# Patient Record
Sex: MALE | Race: WHITE | NOT HISPANIC OR LATINO
[De-identification: names, ages, dates, MRNs, and addresses within clinical notes are randomized per-mention and may not be internally consistent; named-entity substitution may affect disease eponyms.]

---

## 2021-09-16 DIAGNOSIS — Z01.818 ENCOUNTER FOR OTHER PREPROCEDURAL EXAMINATION: ICD-10-CM

## 2021-09-16 PROBLEM — Z00.00 ENCOUNTER FOR PREVENTIVE HEALTH EXAMINATION: Status: ACTIVE | Noted: 2021-09-16

## 2021-09-24 ENCOUNTER — APPOINTMENT (OUTPATIENT)
Dept: DISASTER EMERGENCY | Facility: CLINIC | Age: 71
End: 2021-09-24

## 2021-09-25 LAB — SARS-COV-2 N GENE NPH QL NAA+PROBE: NOT DETECTED

## 2025-03-25 ENCOUNTER — APPOINTMENT (OUTPATIENT)
Dept: ORTHOPEDIC SURGERY | Facility: CLINIC | Age: 75
End: 2025-03-25
Payer: MEDICARE

## 2025-03-25 VITALS — BODY MASS INDEX: 28.04 KG/M2 | WEIGHT: 185 LBS | HEIGHT: 68 IN

## 2025-03-25 DIAGNOSIS — Z78.9 OTHER SPECIFIED HEALTH STATUS: ICD-10-CM

## 2025-03-25 DIAGNOSIS — Z96.652 PRESENCE OF LEFT ARTIFICIAL KNEE JOINT: ICD-10-CM

## 2025-03-25 DIAGNOSIS — Z86.39 PERSONAL HISTORY OF OTHER ENDOCRINE, NUTRITIONAL AND METABOLIC DISEASE: ICD-10-CM

## 2025-03-25 DIAGNOSIS — M17.11 UNILATERAL PRIMARY OSTEOARTHRITIS, RIGHT KNEE: ICD-10-CM

## 2025-03-25 PROCEDURE — 99204 OFFICE O/P NEW MOD 45 MIN: CPT

## 2025-03-25 PROCEDURE — 73564 X-RAY EXAM KNEE 4 OR MORE: CPT | Mod: 50

## 2025-03-25 PROCEDURE — G2211 COMPLEX E/M VISIT ADD ON: CPT

## 2025-03-26 ENCOUNTER — NON-APPOINTMENT (OUTPATIENT)
Age: 75
End: 2025-03-26

## 2025-03-28 ENCOUNTER — TRANSCRIPTION ENCOUNTER (OUTPATIENT)
Age: 75
End: 2025-03-28

## 2025-04-08 ENCOUNTER — OUTPATIENT (OUTPATIENT)
Dept: OUTPATIENT SERVICES | Facility: HOSPITAL | Age: 75
LOS: 1 days | End: 2025-04-08
Payer: MEDICARE

## 2025-04-08 VITALS
HEART RATE: 55 BPM | WEIGHT: 179.9 LBS | OXYGEN SATURATION: 99 % | TEMPERATURE: 98 F | DIASTOLIC BLOOD PRESSURE: 67 MMHG | RESPIRATION RATE: 14 BRPM | SYSTOLIC BLOOD PRESSURE: 150 MMHG | HEIGHT: 67 IN

## 2025-04-08 DIAGNOSIS — Z98.890 OTHER SPECIFIED POSTPROCEDURAL STATES: Chronic | ICD-10-CM

## 2025-04-08 DIAGNOSIS — Z96.652 PRESENCE OF LEFT ARTIFICIAL KNEE JOINT: Chronic | ICD-10-CM

## 2025-04-08 DIAGNOSIS — M17.11 UNILATERAL PRIMARY OSTEOARTHRITIS, RIGHT KNEE: ICD-10-CM

## 2025-04-08 DIAGNOSIS — Z01.818 ENCOUNTER FOR OTHER PREPROCEDURAL EXAMINATION: ICD-10-CM

## 2025-04-08 LAB
A1C WITH ESTIMATED AVERAGE GLUCOSE RESULT: 5.9 % — HIGH (ref 4–5.6)
ALBUMIN SERPL ELPH-MCNC: 3.7 G/DL — SIGNIFICANT CHANGE UP (ref 3.3–5)
ALP SERPL-CCNC: 98 U/L — SIGNIFICANT CHANGE UP (ref 30–120)
ALT FLD-CCNC: 46 U/L — SIGNIFICANT CHANGE UP (ref 10–60)
ANION GAP SERPL CALC-SCNC: 4 MMOL/L — LOW (ref 5–17)
APTT BLD: 30.9 SEC — SIGNIFICANT CHANGE UP (ref 24.5–35.6)
AST SERPL-CCNC: 32 U/L — SIGNIFICANT CHANGE UP (ref 10–40)
BILIRUB SERPL-MCNC: 0.6 MG/DL — SIGNIFICANT CHANGE UP (ref 0.2–1.2)
BLD GP AB SCN SERPL QL: SIGNIFICANT CHANGE UP
BUN SERPL-MCNC: 16 MG/DL — SIGNIFICANT CHANGE UP (ref 7–23)
CALCIUM SERPL-MCNC: 9.1 MG/DL — SIGNIFICANT CHANGE UP (ref 8.4–10.5)
CHLORIDE SERPL-SCNC: 104 MMOL/L — SIGNIFICANT CHANGE UP (ref 96–108)
CO2 SERPL-SCNC: 32 MMOL/L — HIGH (ref 22–31)
CREAT SERPL-MCNC: 0.91 MG/DL — SIGNIFICANT CHANGE UP (ref 0.5–1.3)
EGFR: 87 ML/MIN/1.73M2 — SIGNIFICANT CHANGE UP
EGFR: 87 ML/MIN/1.73M2 — SIGNIFICANT CHANGE UP
ESTIMATED AVERAGE GLUCOSE: 123 MG/DL — HIGH (ref 68–114)
GLUCOSE SERPL-MCNC: 113 MG/DL — HIGH (ref 70–99)
HCT VFR BLD CALC: 44.2 % — SIGNIFICANT CHANGE UP (ref 39–50)
HGB BLD-MCNC: 15 G/DL — SIGNIFICANT CHANGE UP (ref 13–17)
INR BLD: 1.16 RATIO — SIGNIFICANT CHANGE UP (ref 0.85–1.16)
MCHC RBC-ENTMCNC: 29.9 PG — SIGNIFICANT CHANGE UP (ref 27–34)
MCHC RBC-ENTMCNC: 33.9 G/DL — SIGNIFICANT CHANGE UP (ref 32–36)
MCV RBC AUTO: 88.2 FL — SIGNIFICANT CHANGE UP (ref 80–100)
MRSA PCR RESULT.: SIGNIFICANT CHANGE UP
NRBC BLD AUTO-RTO: 0 /100 WBCS — SIGNIFICANT CHANGE UP (ref 0–0)
PLATELET # BLD AUTO: 273 K/UL — SIGNIFICANT CHANGE UP (ref 150–400)
POTASSIUM SERPL-MCNC: 4.8 MMOL/L — SIGNIFICANT CHANGE UP (ref 3.5–5.3)
POTASSIUM SERPL-SCNC: 4.8 MMOL/L — SIGNIFICANT CHANGE UP (ref 3.5–5.3)
PROT SERPL-MCNC: 7.3 G/DL — SIGNIFICANT CHANGE UP (ref 6–8.3)
PROTHROM AB SERPL-ACNC: 13.4 SEC — SIGNIFICANT CHANGE UP (ref 9.9–13.4)
RBC # BLD: 5.01 M/UL — SIGNIFICANT CHANGE UP (ref 4.2–5.8)
RBC # FLD: 11.1 % — SIGNIFICANT CHANGE UP (ref 10.3–14.5)
S AUREUS DNA NOSE QL NAA+PROBE: SIGNIFICANT CHANGE UP
SODIUM SERPL-SCNC: 140 MMOL/L — SIGNIFICANT CHANGE UP (ref 135–145)
WBC # BLD: 6.9 K/UL — SIGNIFICANT CHANGE UP (ref 3.8–10.5)
WBC # FLD AUTO: 6.9 K/UL — SIGNIFICANT CHANGE UP (ref 3.8–10.5)

## 2025-04-08 PROCEDURE — 85730 THROMBOPLASTIN TIME PARTIAL: CPT

## 2025-04-08 PROCEDURE — 86900 BLOOD TYPING SEROLOGIC ABO: CPT

## 2025-04-08 PROCEDURE — 36415 COLL VENOUS BLD VENIPUNCTURE: CPT

## 2025-04-08 PROCEDURE — 83036 HEMOGLOBIN GLYCOSYLATED A1C: CPT

## 2025-04-08 PROCEDURE — 93010 ELECTROCARDIOGRAM REPORT: CPT

## 2025-04-08 PROCEDURE — 87641 MR-STAPH DNA AMP PROBE: CPT

## 2025-04-08 PROCEDURE — 85610 PROTHROMBIN TIME: CPT

## 2025-04-08 PROCEDURE — 86850 RBC ANTIBODY SCREEN: CPT

## 2025-04-08 PROCEDURE — 85027 COMPLETE CBC AUTOMATED: CPT

## 2025-04-08 PROCEDURE — 93005 ELECTROCARDIOGRAM TRACING: CPT

## 2025-04-08 PROCEDURE — G0463: CPT

## 2025-04-08 PROCEDURE — 86901 BLOOD TYPING SEROLOGIC RH(D): CPT

## 2025-04-08 PROCEDURE — 80053 COMPREHEN METABOLIC PANEL: CPT

## 2025-04-08 PROCEDURE — 87640 STAPH A DNA AMP PROBE: CPT

## 2025-04-08 NOTE — H&P PST ADULT - NEUROLOGICAL COMMENTS
old lacunar infarct as per MRI , no residual occipital headache f/u neurologist old lacunar infarct as per MRI , no residual effects  occipital headache f/u neurologist

## 2025-04-08 NOTE — H&P PST ADULT - NSICDXPASTMEDICALHX_GEN_ALL_CORE_FT
PAST MEDICAL HISTORY:  Constipation     Degenerative cervical disc     Degenerative lumbar disc     Occipital headache     Osteoarthritis of right knee      PAST MEDICAL HISTORY:  Constipation     Degenerative cervical disc     Degenerative lumbar disc     Occipital headache     Old lacunar stroke without late effect     Osteoarthritis of right knee

## 2025-04-08 NOTE — H&P PST ADULT - ASSESSMENT
75 y/o male with right knee OA  73 y/o male with right knee OA , H/o old lacunar infarct as per MRI report

## 2025-04-08 NOTE — H&P PST ADULT - NSICDXPASTSURGICALHX_GEN_ALL_CORE_FT
PAST SURGICAL HISTORY:  History of carpal tunnel repair     History of hand surgery     S/P total knee replacement, left

## 2025-04-08 NOTE — H&P PST ADULT - PROBLEM SELECTOR PLAN 1
scheduled for right knee replacement on 4/14/25  will obtain medical clearance   pre op instructions on wash and medications scheduled for right knee replacement on 4/14/25  will obtain medical clearance and neuro note and MRI report in chart   pre op instructions on wash and medications

## 2025-04-08 NOTE — H&P PST ADULT - HISTORY OF PRESENT ILLNESS
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 Reports pain with acticitires , walkig for long diustances . 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 Reports pain with activities , walking for long distances . scheduled for right knee replacement on 4/14/25

## 2025-04-08 NOTE — H&P PST ADULT - GENERAL COMMENTS
Positive COVID 2 weeks ago ested negative on 4/6/25 Positive COVID 2 weeks ago, tested negative on 4/6/25

## 2025-04-14 ENCOUNTER — INPATIENT (INPATIENT)
Facility: HOSPITAL | Age: 75
LOS: 0 days | Discharge: HOME | DRG: 554 | End: 2025-04-15
Attending: ORTHOPAEDIC SURGERY | Admitting: ORTHOPAEDIC SURGERY
Payer: MEDICARE

## 2025-04-14 ENCOUNTER — TRANSCRIPTION ENCOUNTER (OUTPATIENT)
Age: 75
End: 2025-04-14

## 2025-04-14 ENCOUNTER — APPOINTMENT (OUTPATIENT)
Dept: ORTHOPEDIC SURGERY | Facility: HOSPITAL | Age: 75
End: 2025-04-14
Payer: MEDICARE

## 2025-04-14 VITALS
TEMPERATURE: 97 F | WEIGHT: 180.34 LBS | DIASTOLIC BLOOD PRESSURE: 70 MMHG | HEIGHT: 67 IN | OXYGEN SATURATION: 98 % | SYSTOLIC BLOOD PRESSURE: 188 MMHG | HEART RATE: 52 BPM | RESPIRATION RATE: 15 BRPM

## 2025-04-14 DIAGNOSIS — Z98.890 OTHER SPECIFIED POSTPROCEDURAL STATES: Chronic | ICD-10-CM

## 2025-04-14 DIAGNOSIS — M17.11 UNILATERAL PRIMARY OSTEOARTHRITIS, RIGHT KNEE: ICD-10-CM

## 2025-04-14 DIAGNOSIS — Z96.652 PRESENCE OF LEFT ARTIFICIAL KNEE JOINT: Chronic | ICD-10-CM

## 2025-04-14 LAB — ABO RH CONFIRMATION: SIGNIFICANT CHANGE UP

## 2025-04-14 PROCEDURE — 20985 CPTR-ASST DIR MS PX: CPT

## 2025-04-14 PROCEDURE — 93010 ELECTROCARDIOGRAM REPORT: CPT

## 2025-04-14 PROCEDURE — 27447 TOTAL KNEE ARTHROPLASTY: CPT | Mod: RT

## 2025-04-14 PROCEDURE — 73560 X-RAY EXAM OF KNEE 1 OR 2: CPT | Mod: 26,RT

## 2025-04-14 PROCEDURE — 99222 1ST HOSP IP/OBS MODERATE 55: CPT

## 2025-04-14 DEVICE — INSERT TIB BEARING CS X3 SZ 7 9MM: Type: IMPLANTABLE DEVICE | Site: RIGHT | Status: FUNCTIONAL

## 2025-04-14 DEVICE — MAKO BONE PIN 4MM X 110MM: Type: IMPLANTABLE DEVICE | Site: RIGHT | Status: FUNCTIONAL

## 2025-04-14 DEVICE — BASEPLATE TIB TRIATHLON TRITAN SZ 7: Type: IMPLANTABLE DEVICE | Site: RIGHT | Status: FUNCTIONAL

## 2025-04-14 DEVICE — PATELLA ASYMMETRIC 38MM TRIATHLON: Type: IMPLANTABLE DEVICE | Site: RIGHT | Status: FUNCTIONAL

## 2025-04-14 DEVICE — COMP FEM CR CMNTLSS BEADED W/ PA SZ 6 RT: Type: IMPLANTABLE DEVICE | Site: RIGHT | Status: FUNCTIONAL

## 2025-04-14 DEVICE — MAKO BONE PIN 4MM X 140MM: Type: IMPLANTABLE DEVICE | Site: RIGHT | Status: FUNCTIONAL

## 2025-04-14 RX ORDER — SENNA 187 MG
2 TABLET ORAL AT BEDTIME
Refills: 0 | Status: DISCONTINUED | OUTPATIENT
Start: 2025-04-14 | End: 2025-04-15

## 2025-04-14 RX ORDER — ONDANSETRON HCL/PF 4 MG/2 ML
4 VIAL (ML) INJECTION EVERY 6 HOURS
Refills: 0 | Status: DISCONTINUED | OUTPATIENT
Start: 2025-04-14 | End: 2025-04-15

## 2025-04-14 RX ORDER — MAGNESIUM HYDROXIDE 400 MG/5ML
30 SUSPENSION ORAL DAILY
Refills: 0 | Status: DISCONTINUED | OUTPATIENT
Start: 2025-04-14 | End: 2025-04-15

## 2025-04-14 RX ORDER — TRANEXAMIC ACID 1000 MG/10
1000 AMPUL (ML) INTRAVENOUS ONCE
Refills: 0 | Status: COMPLETED | OUTPATIENT
Start: 2025-04-14 | End: 2025-04-14

## 2025-04-14 RX ORDER — ONDANSETRON HCL/PF 4 MG/2 ML
4 VIAL (ML) INJECTION ONCE
Refills: 0 | Status: DISCONTINUED | OUTPATIENT
Start: 2025-04-14 | End: 2025-04-14

## 2025-04-14 RX ORDER — ACETAMINOPHEN 500 MG/5ML
1000 LIQUID (ML) ORAL ONCE
Refills: 0 | Status: COMPLETED | OUTPATIENT
Start: 2025-04-14 | End: 2025-04-14

## 2025-04-14 RX ORDER — PSYLLIUM SEED (WITH DEXTROSE)
0 POWDER (GRAM) ORAL
Refills: 0 | DISCHARGE

## 2025-04-14 RX ORDER — HYDROMORPHONE/SOD CHLOR,ISO/PF 2 MG/10 ML
0.5 SYRINGE (ML) INJECTION
Refills: 0 | Status: DISCONTINUED | OUTPATIENT
Start: 2025-04-14 | End: 2025-04-15

## 2025-04-14 RX ORDER — DEXAMETHASONE 0.5 MG/1
8 TABLET ORAL ONCE
Refills: 0 | Status: COMPLETED | OUTPATIENT
Start: 2025-04-15 | End: 2025-04-15

## 2025-04-14 RX ORDER — POLYETHYLENE GLYCOL 3350 17 G/17G
17 POWDER, FOR SOLUTION ORAL AT BEDTIME
Refills: 0 | Status: DISCONTINUED | OUTPATIENT
Start: 2025-04-14 | End: 2025-04-15

## 2025-04-14 RX ORDER — OXYCODONE HYDROCHLORIDE 30 MG/1
5 TABLET ORAL
Refills: 0 | Status: DISCONTINUED | OUTPATIENT
Start: 2025-04-14 | End: 2025-04-15

## 2025-04-14 RX ORDER — OXYCODONE HYDROCHLORIDE 30 MG/1
10 TABLET ORAL
Refills: 0 | Status: DISCONTINUED | OUTPATIENT
Start: 2025-04-14 | End: 2025-04-15

## 2025-04-14 RX ORDER — CEFAZOLIN SODIUM IN 0.9 % NACL 3 G/100 ML
2000 INTRAVENOUS SOLUTION, PIGGYBACK (ML) INTRAVENOUS EVERY 8 HOURS
Refills: 0 | Status: COMPLETED | OUTPATIENT
Start: 2025-04-14 | End: 2025-04-15

## 2025-04-14 RX ORDER — SENNOSIDES, DOCUSATE SODIUM 8.6; 5 MG/1; MG/1
0 TABLET ORAL
Refills: 0 | DISCHARGE

## 2025-04-14 RX ORDER — CEFAZOLIN SODIUM IN 0.9 % NACL 3 G/100 ML
2000 INTRAVENOUS SOLUTION, PIGGYBACK (ML) INTRAVENOUS ONCE
Refills: 0 | Status: COMPLETED | OUTPATIENT
Start: 2025-04-14 | End: 2025-04-14

## 2025-04-14 RX ORDER — HYDROMORPHONE/SOD CHLOR,ISO/PF 2 MG/10 ML
0.5 SYRINGE (ML) INJECTION
Refills: 0 | Status: DISCONTINUED | OUTPATIENT
Start: 2025-04-14 | End: 2025-04-14

## 2025-04-14 RX ORDER — APREPITANT 40 MG/1
40 CAPSULE ORAL ONCE
Refills: 0 | Status: COMPLETED | OUTPATIENT
Start: 2025-04-14 | End: 2025-04-14

## 2025-04-14 RX ORDER — BISACODYL 5 MG
10 TABLET, DELAYED RELEASE (ENTERIC COATED) ORAL ONCE
Refills: 0 | Status: DISCONTINUED | OUTPATIENT
Start: 2025-04-16 | End: 2025-04-15

## 2025-04-14 RX ORDER — SODIUM CHLORIDE 9 G/1000ML
1000 INJECTION, SOLUTION INTRAVENOUS
Refills: 0 | Status: DISCONTINUED | OUTPATIENT
Start: 2025-04-14 | End: 2025-04-15

## 2025-04-14 RX ORDER — SODIUM CHLORIDE 9 G/1000ML
1000 INJECTION, SOLUTION INTRAVENOUS
Refills: 0 | Status: DISCONTINUED | OUTPATIENT
Start: 2025-04-14 | End: 2025-04-14

## 2025-04-14 RX ORDER — CELECOXIB 50 MG/1
200 CAPSULE ORAL EVERY 12 HOURS
Refills: 0 | Status: DISCONTINUED | OUTPATIENT
Start: 2025-04-14 | End: 2025-04-15

## 2025-04-14 RX ORDER — HYDROMORPHONE/SOD CHLOR,ISO/PF 2 MG/10 ML
0.2 SYRINGE (ML) INJECTION
Refills: 0 | Status: DISCONTINUED | OUTPATIENT
Start: 2025-04-14 | End: 2025-04-14

## 2025-04-14 RX ORDER — ACETAMINOPHEN 500 MG/5ML
1000 LIQUID (ML) ORAL EVERY 8 HOURS
Refills: 0 | Status: DISCONTINUED | OUTPATIENT
Start: 2025-04-15 | End: 2025-04-15

## 2025-04-14 RX ADMIN — Medication 10 MILLIGRAM(S): at 17:14

## 2025-04-14 RX ADMIN — SODIUM CHLORIDE 75 MILLILITER(S): 9 INJECTION, SOLUTION INTRAVENOUS at 16:03

## 2025-04-14 RX ADMIN — Medication 0.5 MILLIGRAM(S): at 17:46

## 2025-04-14 RX ADMIN — Medication 1 APPLICATION(S): at 10:13

## 2025-04-14 RX ADMIN — Medication 0.5 MILLIGRAM(S): at 17:33

## 2025-04-14 RX ADMIN — Medication 400 MILLIGRAM(S): at 22:06

## 2025-04-14 RX ADMIN — APREPITANT 40 MILLIGRAM(S): 40 CAPSULE ORAL at 10:14

## 2025-04-14 RX ADMIN — POLYETHYLENE GLYCOL 3350 17 GRAM(S): 17 POWDER, FOR SOLUTION ORAL at 22:09

## 2025-04-14 RX ADMIN — CELECOXIB 200 MILLIGRAM(S): 50 CAPSULE ORAL at 22:07

## 2025-04-14 RX ADMIN — Medication 2 TABLET(S): at 22:08

## 2025-04-14 RX ADMIN — Medication 1000 MILLIGRAM(S): at 22:24

## 2025-04-14 RX ADMIN — Medication 100 MILLIGRAM(S): at 22:06

## 2025-04-14 NOTE — DISCHARGE NOTE PROVIDER - HOSPITAL COURSE
This patient was admitted to [facility] with a history of severe degenerative joint disease of the right knee.  Patient underwent Pre-Surgical Testing and was medically cleared to undergo elective procedure. Patient underwent Right Total Knee Replacement with KOMAL by Dr. Barnes. Procedure was well tolerated.  No operative or mike-operative complications arose during patient's hospital course.  Patient received antibiotic according to SCIP guidelines for infection prevention.  Ecotrin 81mg every 12 hours was given for DVT prophylaxis, in addition to the use of SCDs.  Anesthesia, Medical Hospitalist, Physical Therapy and Occupational Therapy were consulted. Patient is stable for discharge with a good prognosis.  Appropriate discharge instructions and medications are provided in this document.  Patient is going home with home care (PT/OT/Skilled Nursing).

## 2025-04-14 NOTE — BRIEF OPERATIVE NOTE - NSICDXBRIEFPROCEDURE_GEN_ALL_CORE_FT
PROCEDURES:  Arthroplasty, knee, robot-assisted, using Abloomy system 14-Apr-2025 14:37:21 Right Total Knee Replacement with Carmen Ramirez

## 2025-04-14 NOTE — CONSULT NOTE ADULT - ASSESSMENT
Aftercare s/p R knee arthroplasty  - pain control as per ortho team  - IV fluid and postop antibiotics as per ortho team  - PT/OT consult  - VTE prophylaxis as per ortho    Hypertensive urgency  -asymptomatic at this time  -not on any home antihypertensives  -systolic  in PACU  -hydralazine 5mg IV q4 PRN for systolic BP>180, will start first dose and increase as needed Aftercare s/p R knee arthroplasty  - pain control as per ortho team  - IV fluid and postop antibiotics as per ortho team  - PT/OT consult  - VTE prophylaxis as per ortho    Hypertensive urgency  -asymptomatic at this time  -not on any home antihypertensives  -systolic  in PACU  -hydralazine 10mg IV q4 PRN for systolic BP>180, will start first dose and increase as needed  -avoiding labetalol given nurse reports bradycardia in PACU

## 2025-04-14 NOTE — PHYSICAL THERAPY INITIAL EVALUATION ADULT - ADDITIONAL COMMENTS
Pt lives with wife and daughter in a private house, there are 8 stairs +HR to enter and 6 stairs +HR to the primary bedroom/bathroom. PTA pt was independent with ADLs and ambulation with RW. Pt owns a RW and commode.

## 2025-04-14 NOTE — PHYSICAL THERAPY INITIAL EVALUATION ADULT - GAIT DEVIATIONS NOTED, PT EVAL
decreased jose m/increased time in double stance/decreased step length/decreased weight-shifting ability

## 2025-04-14 NOTE — PATIENT PROFILE ADULT - FALL HARM RISK - RISK INTERVENTIONS

## 2025-04-14 NOTE — DISCHARGE NOTE PROVIDER - PROVIDER TOKENS
PROVIDER:[TOKEN:[[id number]],SCHEDULEDAPPT:[04/29/2025],SCHEDULEDAPPTTIME:[10:30 AM],ESTABLISHEDPATIENT:[T]]

## 2025-04-14 NOTE — DISCHARGE NOTE PROVIDER - NSDCFUSCHEDAPPT_GEN_ALL_CORE_FT
Rodriguez Barnes  [facility] Physician Formerly [facility]  [facility] [address] [facility]  Scheduled Appointment: 04/29/2025

## 2025-04-14 NOTE — DISCHARGE NOTE PROVIDER - NSDCCPTREATMENT_GEN_ALL_CORE_FT
PRINCIPAL PROCEDURE  Procedure: Arthroplasty, knee, robot-assisted, using Shaheed system  Findings and Treatment:

## 2025-04-14 NOTE — PRE-OP CHECKLIST - LATEX ALLERGY
For upcoming office visit, please order metab panel, CBC, and possibly iron studies and any other tests.     Thanks, Julia Flanagan no

## 2025-04-14 NOTE — DISCHARGE NOTE PROVIDER - NSDCMRMEDTOKEN_GEN_ALL_CORE_FT
Docusate-Senna:   Metamucil 1.7 g oral wafer: orally once a day (at bedtime)   acetaminophen 500 mg oral tablet: 2 tab(s) orally every 8 hours  Aspirin EC 81 mg oral delayed release tablet: 1 tab(s) orally every 12 hours 2 hours prior to celebrex  celecoxib 200 mg oral capsule: 1 cap(s) orally every 12 hours 2  hours after celebrex MDD: 2  Docusate-Senna:   Metamucil 1.7 g oral wafer: orally once a day (at bedtime)  naloxone 0.4 mg/mL injectable solution: 0.4 milligram(s) intravenously once as needed for overdose MDD: 1  omeprazole 20 mg oral delayed release tablet: 1 tab(s) orally once a day MDD: 1  oxyCODONE 5 mg oral tablet: 1 tab(s) orally every 4 hours as needed for  severe pain MDD: 6   acetaminophen 500 mg oral tablet: 2 tab(s) orally every 8 hours  Aspirin EC 81 mg oral delayed release tablet: 1 tab(s) orally every 12 hours 2 hours prior to celebrex  celecoxib 200 mg oral capsule: 1 cap(s) orally every 12 hours 2  hours after celebrex MDD: 2  Docusate-Senna:   Metamucil 1.7 g oral wafer: orally once a day (at bedtime)  naloxone 0.4 mg/mL injectable solution: 0.4 milligram(s) intravenously once as needed for overdose MDD: 1  omeprazole 20 mg oral delayed release tablet: 1 tab(s) orally once a day MDD: 1  oxyCODONE 5 mg oral tablet: 1 tab(s) orally every 4 hours as needed for  severe pain MDD: 6  polyethylene glycol 3350 oral powder for reconstitution: 17 gram(s) orally once a day (at bedtime)  senna leaf extract oral tablet: 2 tab(s) orally once a day (at bedtime)

## 2025-04-14 NOTE — CONSULT NOTE ADULT - SUBJECTIVE AND OBJECTIVE BOX
Patient is a 74y Male with PMH of OA knee who presents for total knee replacement. Patient underwent R total knee arthroplasty on 4/14, procedure was unremarkable as per postop note. Patient reports no loss of sensation in lower extremities. Nurse reports elevated BP in 200s in PACU, patient reports his BP was in 140s at home but it increases around healthcare settings and he attributes it to stress and anxiety. Reports he is a former smoker, denies cardiac history, reports hx of CVA          REVIEW OF SYSTEMS:  CONSTITUTIONAL: No fever, weight loss, or fatigue  EYES: No eye pain, visual disturbances, or discharge  ENMT:  No difficulty hearing, tinnitus, vertigo; No sinus or throat pain  NECK: No pain or stiffness  RESPIRATORY: No cough, wheezing, chills or hemoptysis; No shortness of breath  CARDIOVASCULAR: No chest pain, palpitations, dizziness, or leg swelling  GASTROINTESTINAL: No abdominal or epigastric pain. No nausea, vomiting, or hematemesis; No diarrhea or constipation.   GENITOURINARY: No dysuria, frequency, hematuria, or incontinence  NEUROLOGICAL: No headaches, memory loss, loss of strength, numbness, or tremors  SKIN: No itching, burning, rashes, or lesions   MUSCULOSKELETAL: No joint pain or swelling; No muscle, back, or extremity pain  HEME/LYMPH: No easy bruising, or bleeding gums  ALLERGY AND IMMUNOLOGIC: No hives or eczema      GENERAL: Adult Male, patient appears well, no acute distress, appropriate behavior  EYES: sclera clear, no exudates, PERRLA  ENMT: moist mucous membranes, oropharynx clear without erythema, no exudates  LUNGS:  no increased work of breathing, no wheezing appreciated  HEART: no lower extremity edema appreciated  GASTROINTESTINAL: abdomen is nondistended, no palpable masses appreciated  INTEGUMENT: good skin turgor, warm, dry and intact, no lesions appreciated  MUSCULOSKELETAL: no clubbing or cyanosis, no obvious deformity  NEUROLOGIC: awake, alert, oriented x3, strength no obvious sensory deficits  PSYCHIATRIC: mood is good, affect is congruent, linear and logical thought process  HEME/LYMPH:  no obvious ecchymosis or petechiae

## 2025-04-14 NOTE — DISCHARGE NOTE PROVIDER - NSDCCPCAREPLAN_GEN_ALL_CORE_FT
PRINCIPAL DISCHARGE DIAGNOSIS  Diagnosis: Right knee DJD  Assessment and Plan of Treatment: Right Total Knee Replacement with KOMAL   For your total knee replacement:  Physical Therapy/Occupational Therapy for: ambulation, transfers, stairs, ADL's (activities of daily living), range of motion exercises, and isometrics  -Activity  • Weight Bearing as tolerated with rolling walker.  • Cryo/cuff 20 minutes several times daily with at least a 1 hour break in between icing sessions  • Take short, frequent walks increasing the distance that you walk each day as tolerated.  • Change your position every hour to decrease pain and stiffness.  • Continue the exercises taught to you by your physical therapist.  • No driving until cleared by the doctor.  • No tub baths, hot tubs, or swimming pools until instructed by your doctor.  • Do not squat down on the floor.  • Do not kneel or twist your knee.  • Range of Motion Goals: Flexion= 120 degrees, Extension = 0 degrees  Daily dressing changes if incision is not completely dry.  If inicision is dry, it may be left open to air.  Keep incision clean. DO NOT APPLY ANYTHING to incision site (salves/ointments/creams).  May shower post-op if no drainage from incision.  Do not scrub incision site. Pat dry after shower.  You have a MAYELA dressing. You may shower. Disconnect MAYELA battery prior to showering. Reconnect battery after showering and press orange button to resume MAYELA power. Remove MAYELA dressing on post-op day #7.  Keep incision clean. DO NOT APPLY ANYTHING to incision site (salves/ointments/creams). Do not scrub incision site. Pat dry after shower.      
patient

## 2025-04-14 NOTE — DISCHARGE NOTE PROVIDER - CARE PROVIDER_API CALL
Rodriguez Barnes Jeffrey  Orthopaedic Surgery  [address] [place] [id number]  Phone: [phone]  Fax: [phone]  Established Patient  Scheduled Appointment: 04/29/2025 10:30 AM

## 2025-04-14 NOTE — DISCHARGE NOTE PROVIDER - NSDCFUADDINST_GEN_ALL_CORE_FT
Pt discharged to home with son.   - Call your doctor if you experience:  • An increase in pain not controlled by pain medication or change in activity or  position.  • Temperature greater than 101° F.  • Redness, increased swelling or foul smelling drainage from or around the  incision.  • Numbness, tingling or a change in color or temperature of the operative leg.  • Call your doctor immediately if you experience chest pain, shortness of breath or calf pain.

## 2025-04-15 ENCOUNTER — TRANSCRIPTION ENCOUNTER (OUTPATIENT)
Age: 75
End: 2025-04-15

## 2025-04-15 VITALS
SYSTOLIC BLOOD PRESSURE: 194 MMHG | RESPIRATION RATE: 18 BRPM | HEART RATE: 65 BPM | TEMPERATURE: 98 F | DIASTOLIC BLOOD PRESSURE: 81 MMHG | OXYGEN SATURATION: 98 %

## 2025-04-15 LAB
ANION GAP SERPL CALC-SCNC: 5 MMOL/L — SIGNIFICANT CHANGE UP (ref 5–17)
BUN SERPL-MCNC: 14 MG/DL — SIGNIFICANT CHANGE UP (ref 7–23)
CALCIUM SERPL-MCNC: 8.4 MG/DL — SIGNIFICANT CHANGE UP (ref 8.4–10.5)
CHLORIDE SERPL-SCNC: 104 MMOL/L — SIGNIFICANT CHANGE UP (ref 96–108)
CO2 SERPL-SCNC: 30 MMOL/L — SIGNIFICANT CHANGE UP (ref 22–31)
CREAT SERPL-MCNC: 0.8 MG/DL — SIGNIFICANT CHANGE UP (ref 0.5–1.3)
EGFR: 92 ML/MIN/1.73M2 — SIGNIFICANT CHANGE UP
EGFR: 92 ML/MIN/1.73M2 — SIGNIFICANT CHANGE UP
GLUCOSE SERPL-MCNC: 128 MG/DL — HIGH (ref 70–99)
HCT VFR BLD CALC: 37.8 % — LOW (ref 39–50)
HGB BLD-MCNC: 13 G/DL — SIGNIFICANT CHANGE UP (ref 13–17)
MCHC RBC-ENTMCNC: 30.1 PG — SIGNIFICANT CHANGE UP (ref 27–34)
MCHC RBC-ENTMCNC: 34.4 G/DL — SIGNIFICANT CHANGE UP (ref 32–36)
MCV RBC AUTO: 87.5 FL — SIGNIFICANT CHANGE UP (ref 80–100)
NRBC BLD AUTO-RTO: 0 /100 WBCS — SIGNIFICANT CHANGE UP (ref 0–0)
PLATELET # BLD AUTO: 231 K/UL — SIGNIFICANT CHANGE UP (ref 150–400)
POTASSIUM SERPL-MCNC: 4.1 MMOL/L — SIGNIFICANT CHANGE UP (ref 3.5–5.3)
POTASSIUM SERPL-SCNC: 4.1 MMOL/L — SIGNIFICANT CHANGE UP (ref 3.5–5.3)
RBC # BLD: 4.32 M/UL — SIGNIFICANT CHANGE UP (ref 4.2–5.8)
RBC # FLD: 11.3 % — SIGNIFICANT CHANGE UP (ref 10.3–14.5)
SODIUM SERPL-SCNC: 139 MMOL/L — SIGNIFICANT CHANGE UP (ref 135–145)
WBC # BLD: 14.94 K/UL — HIGH (ref 3.8–10.5)
WBC # FLD AUTO: 14.94 K/UL — HIGH (ref 3.8–10.5)

## 2025-04-15 PROCEDURE — 97165 OT EVAL LOW COMPLEX 30 MIN: CPT

## 2025-04-15 PROCEDURE — 97116 GAIT TRAINING THERAPY: CPT

## 2025-04-15 PROCEDURE — 99233 SBSQ HOSP IP/OBS HIGH 50: CPT

## 2025-04-15 PROCEDURE — 97161 PT EVAL LOW COMPLEX 20 MIN: CPT

## 2025-04-15 PROCEDURE — C1713: CPT

## 2025-04-15 PROCEDURE — C1776: CPT

## 2025-04-15 PROCEDURE — S2900: CPT

## 2025-04-15 PROCEDURE — 85027 COMPLETE CBC AUTOMATED: CPT

## 2025-04-15 PROCEDURE — 36415 COLL VENOUS BLD VENIPUNCTURE: CPT

## 2025-04-15 PROCEDURE — 93005 ELECTROCARDIOGRAM TRACING: CPT

## 2025-04-15 PROCEDURE — 97110 THERAPEUTIC EXERCISES: CPT

## 2025-04-15 PROCEDURE — 97530 THERAPEUTIC ACTIVITIES: CPT

## 2025-04-15 PROCEDURE — 73560 X-RAY EXAM OF KNEE 1 OR 2: CPT

## 2025-04-15 PROCEDURE — 27447 TOTAL KNEE ARTHROPLASTY: CPT

## 2025-04-15 PROCEDURE — 80048 BASIC METABOLIC PNL TOTAL CA: CPT

## 2025-04-15 RX ORDER — POLYETHYLENE GLYCOL 3350 17 G/17G
17 POWDER, FOR SOLUTION ORAL
Qty: 0 | Refills: 0 | DISCHARGE
Start: 2025-04-15

## 2025-04-15 RX ORDER — NALOXONE HYDROCHLORIDE 0.4 MG/ML
0.4 INJECTION, SOLUTION INTRAMUSCULAR; INTRAVENOUS; SUBCUTANEOUS
Qty: 1 | Refills: 0
Start: 2025-04-15

## 2025-04-15 RX ORDER — OMEPRAZOLE 20 MG/1
1 CAPSULE, DELAYED RELEASE ORAL
Qty: 30 | Refills: 0
Start: 2025-04-15 | End: 2025-05-14

## 2025-04-15 RX ORDER — ASPIRIN 325 MG
81 TABLET ORAL EVERY 12 HOURS
Refills: 0 | Status: DISCONTINUED | OUTPATIENT
Start: 2025-04-15 | End: 2025-04-15

## 2025-04-15 RX ORDER — OXYCODONE HYDROCHLORIDE 30 MG/1
1 TABLET ORAL
Qty: 42 | Refills: 0
Start: 2025-04-15 | End: 2025-04-21

## 2025-04-15 RX ORDER — SENNA 187 MG
2 TABLET ORAL
Qty: 0 | Refills: 0 | DISCHARGE
Start: 2025-04-15

## 2025-04-15 RX ORDER — LOSARTAN POTASSIUM 100 MG/1
1 TABLET, FILM COATED ORAL
Qty: 0 | Refills: 0 | DISCHARGE
Start: 2025-04-15

## 2025-04-15 RX ORDER — ASPIRIN 325 MG
1 TABLET ORAL
Qty: 56 | Refills: 0
Start: 2025-04-15 | End: 2025-05-12

## 2025-04-15 RX ORDER — ACETAMINOPHEN 500 MG/5ML
2 LIQUID (ML) ORAL
Qty: 0 | Refills: 0 | DISCHARGE
Start: 2025-04-15

## 2025-04-15 RX ORDER — LOSARTAN POTASSIUM 100 MG/1
50 TABLET, FILM COATED ORAL DAILY
Refills: 0 | Status: DISCONTINUED | OUTPATIENT
Start: 2025-04-15 | End: 2025-04-15

## 2025-04-15 RX ORDER — CELECOXIB 50 MG/1
1 CAPSULE ORAL
Qty: 42 | Refills: 0
Start: 2025-04-15 | End: 2025-05-05

## 2025-04-15 RX ADMIN — Medication 1000 MILLIGRAM(S): at 13:09

## 2025-04-15 RX ADMIN — Medication 81 MILLIGRAM(S): at 08:35

## 2025-04-15 RX ADMIN — LOSARTAN POTASSIUM 50 MILLIGRAM(S): 100 TABLET, FILM COATED ORAL at 13:08

## 2025-04-15 RX ADMIN — Medication 40 MILLIGRAM(S): at 05:30

## 2025-04-15 RX ADMIN — Medication 1000 MILLIGRAM(S): at 13:08

## 2025-04-15 RX ADMIN — CELECOXIB 200 MILLIGRAM(S): 50 CAPSULE ORAL at 11:32

## 2025-04-15 RX ADMIN — CELECOXIB 200 MILLIGRAM(S): 50 CAPSULE ORAL at 01:46

## 2025-04-15 RX ADMIN — CELECOXIB 200 MILLIGRAM(S): 50 CAPSULE ORAL at 11:11

## 2025-04-15 RX ADMIN — Medication 100 MILLIGRAM(S): at 05:29

## 2025-04-15 RX ADMIN — DEXAMETHASONE 101.6 MILLIGRAM(S): 0.5 TABLET ORAL at 05:28

## 2025-04-15 RX ADMIN — Medication 1000 MILLIGRAM(S): at 05:30

## 2025-04-15 RX ADMIN — Medication 1000 MILLIGRAM(S): at 05:37

## 2025-04-15 NOTE — PATIENT CHOICE NOTE. - NSPTCHOICENOTES_GEN_ALL_CORE
TRANSITION OF CARE PLAN  Patient A&OX4;  is self-directing own DC planning; DC to home with [facility] per patient's choice; spouse CHANTAL JACINTO [phone] will assume care;  to  f/u with RHONDA GROVER MD post DC;  will arrange own transport at DC. NO DME NEEDS; Patient was instructed to obtain medical clearance prior to driving, verbalized understanding;  Writer furnished patient with care transitions folder and CM contact information for reference;

## 2025-04-15 NOTE — DISCHARGE NOTE NURSING/CASE MANAGEMENT/SOCIAL WORK - NSSCTYPOFSERV_GEN_ALL_CORE
[facility]/ Home Care Services with [facility] ( / [phone] )  Home Care RN will call within 24/48 hrs of DC from the hospital to set up Initial Assessment.

## 2025-04-15 NOTE — CARE COORDINATION ASSESSMENT. - NSPASTMEDSURGHISTORY_GEN_ALL_CORE_FT
PAST MEDICAL & SURGICAL HISTORY:  Old lacunar stroke without late effect      Occipital headache      Degenerative cervical disc      Degenerative lumbar disc      Constipation      Osteoarthritis of right knee      History of hand surgery      History of carpal tunnel repair      S/P total knee replacement, left

## 2025-04-15 NOTE — DISCHARGE NOTE NURSING/CASE MANAGEMENT/SOCIAL WORK - PATIENT PORTAL LINK FT
You can access the FollowMyHealth Patient Portal offered by [facility] by registering at the following website: [email or web address]. By joining [facility]’s FollowMyHealth portal, you will also be able to view your health information using other applications (apps) compatible with our system.

## 2025-04-15 NOTE — CARE COORDINATION ASSESSMENT. - NSCAREPROVIDERS_GEN_ALL_CORE_FT
CARE PROVIDERS:  Administration: Isela Gutierrez  Administration: Sharan Umanzor  Admitting: Rodriguez Barnes  Attending: Rodriguez Barnes  Case Management: Santaromana, Anna  Infection Control: Marivel Wood  Nurse: Jazz Abernathy  Nurse: Tia Cartwright  Nurse: Yadira Anne  Nurse: Marlene Spivey  Occupational Therapy: Saul Holley  Override: Jazz Abernathy  Physical Therapy: Kojo Tyler  Primary Team: Rolando Miles  Primary Team: Carmen Steinberg  Primary Team: Judith Mike  Primary Team: Gustavo Dumont  Primary Team: Georges Westbrook  Primary Team: Joel Laureano  Team: PUJA JAMES Hospitalists, Team

## 2025-04-15 NOTE — CAREGIVER ENGAGEMENT NOTE - CAREGIVER OUTREACH NOTES - FREE TEXT
TRANSITION OF CARE PLAN  Patient A&OX4;  is self-directing own DC planning; DC to home with [facility] per patient's choice; spouse CHANTAL JACINTO [phone] will assume care;  to  f/u with RHONDA GROVER MD post DC;  will arrange own transport at DC. NO DME NEEDS; Patient was instructed to obtain medical clearance prior to driving, verbalized understanding;  Writer furnished patient with care transitions folder and CM contact information for reference;

## 2025-04-15 NOTE — OCCUPATIONAL THERAPY INITIAL EVALUATION ADULT - PERTINENT HX OF CURRENT PROBLEM, REHAB EVAL
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 Reports pain with activities , walking for long distances . scheduled for right knee replacement on 4/14/25.  4/14 s/p R TKR

## 2025-04-15 NOTE — PATIENT CHOICE NOTE. - NSPTCHOICESTATE_GEN_ALL_CORE

## 2025-04-15 NOTE — PROGRESS NOTE ADULT - SUBJECTIVE AND OBJECTIVE BOX
Discharge Medication Counseling:  IWT26ro BID x 4 weeks  Omeprazole 20 mg QD x 4 weeks  Celecoxib 200mg q12h x 2-3 weeks  APAP 1000mg PO Q8h x 2-3 weeks  Narcotic PRN  Docusate 100mg TID while taking narcotic  Miralax, Senna, or Bisacodyl PRN for treatment of constipation

## 2025-04-15 NOTE — DISCHARGE NOTE NURSING/CASE MANAGEMENT/SOCIAL WORK - FINANCIAL ASSISTANCE
[facility] provides services at a reduced cost to those who are determined to be eligible through [facility]’s financial assistance program. Information regarding [facility]’s financial assistance program can be found by going to [email or web address] or by calling [phone].

## 2025-04-15 NOTE — PROGRESS NOTE ADULT - ASSESSMENT
Aftercare s/p R knee arthroplasty  - pain control as per ortho team  - s/p IV fluid and postop antibiotics as per ortho team  - PT/OT consult  - VTE prophylaxis as per ortho  -medically stable for discharge    Hypertensive urgency  -asymptomatic at this time  -not on any home antihypertensives  -systolic  in PACU, improved after hydralazine  -avoiding labetalol given nurse reports bradycardia in PACU Aftercare s/p R knee arthroplasty  - pain control as per ortho team  - s/p IV fluid and postop antibiotics as per ortho team  - PT/OT consult  - VTE prophylaxis as per ortho  -medically stable for discharge    Hypertensive urgency  -asymptomatic at this time  -patient reports he is not taking home antihypertensives but has losartan 50mg  -systolic  in PACU, improved after hydralazine  -resumed losartan, will monitor and advise patient to continue at home until followup with PCP  -avoiding labetalol given nurse reports bradycardia in PACU

## 2025-04-15 NOTE — OCCUPATIONAL THERAPY INITIAL EVALUATION ADULT - LIVES WITH, PROFILE
private home, private home, 8+6 steps +rail in front, 1 step + 12 steps with HR on side door, high ranch home, tub shower/spouse

## 2025-04-15 NOTE — DISCHARGE NOTE NURSING/CASE MANAGEMENT/SOCIAL WORK - NSDCPEFALRISK_GEN_ALL_CORE
For information on Fall & Injury Prevention, visit: [email or web address] OR  [email or web address] OR  https://www.cdc.gov/steadi/patient.html

## 2025-04-15 NOTE — PHARMACOTHERAPY INTERVENTION NOTE - COMMENTS
Admission medication reconciliation POD1 
Transition of Care video discharge education - medication calendar given to patient  Val Brock, PharmD. Clinical Pharmacy Specialist, Department of Orthopedics Surgery at [facility].

## 2025-04-15 NOTE — CARE COORDINATION ASSESSMENT. - NSADDITIONAL INFORMATION_FT
RN CARRILLO met with pt. at bedside, pt. A&O x4; stated he was cleared to leave same day, today CM role and DC planning process explained; verbalized understanding.   Pt. reports; lives with spouse in pvt home 8 JAYME to enter; 6STE to bedroom;  independent in ambulation, stairs, ADLs/ iADLs; strong family support at home; Patient reports good compliance with home medications and MD f/u visits;   PCP: DAVID FREIRE MD  DME at home: RW; COMMODE    TRANSITION OF CARE PLAN  Patient A&OX4;  is self-directing own DC planning; DC to home with [facility] per patient's choice; spouse CHANTAL JACINTO [phone] will assume care;  to  f/u with RHONDA GROVER MD post DC;  will arrange own transport at DC. NO DME NEEDS; Patient was instructed to obtain medical clearance prior to driving, verbalized understanding;  Writer furnished patient with care transitions folder and CM contact information for reference;

## 2025-04-16 DIAGNOSIS — Z47.1 AFTERCARE FOLLOWING JOINT REPLACEMENT SURGERY: ICD-10-CM

## 2025-04-16 DIAGNOSIS — I16.0 HYPERTENSIVE URGENCY: ICD-10-CM

## 2025-04-21 ENCOUNTER — NON-APPOINTMENT (OUTPATIENT)
Age: 75
End: 2025-04-21

## 2025-04-29 ENCOUNTER — APPOINTMENT (OUTPATIENT)
Dept: ORTHOPEDIC SURGERY | Facility: CLINIC | Age: 75
End: 2025-04-29
Payer: MEDICARE

## 2025-04-29 VITALS — BODY MASS INDEX: 28.04 KG/M2 | HEIGHT: 68 IN | WEIGHT: 185 LBS

## 2025-04-29 PROBLEM — M50.30 OTHER CERVICAL DISC DEGENERATION, UNSPECIFIED CERVICAL REGION: Chronic | Status: ACTIVE | Noted: 2025-04-08

## 2025-04-29 PROBLEM — R51.9 HEADACHE, UNSPECIFIED: Chronic | Status: ACTIVE | Noted: 2025-04-08

## 2025-04-29 PROBLEM — M51.369 OTHER INTERVERTEBRAL DISC DEGENERATION, LUMBAR REGION WITHOUT MENTION OF LUMBAR BACK PAIN OR LOWER EXTREMITY PAIN: Chronic | Status: ACTIVE | Noted: 2025-04-08

## 2025-04-29 PROCEDURE — 73562 X-RAY EXAM OF KNEE 3: CPT | Mod: RT

## 2025-04-29 PROCEDURE — 99024 POSTOP FOLLOW-UP VISIT: CPT

## 2025-04-29 RX ORDER — LOSARTAN POTASSIUM 100 MG/1
TABLET, FILM COATED ORAL
Refills: 0 | Status: ACTIVE | COMMUNITY

## 2025-04-30 DIAGNOSIS — Z96.651 PRESENCE OF RIGHT ARTIFICIAL KNEE JOINT: ICD-10-CM

## 2025-05-05 DIAGNOSIS — G89.29 PAIN IN LEFT KNEE: ICD-10-CM

## 2025-05-05 DIAGNOSIS — M25.562 PAIN IN LEFT KNEE: ICD-10-CM

## 2025-05-19 ENCOUNTER — NON-APPOINTMENT (OUTPATIENT)
Age: 75
End: 2025-05-19

## 2025-06-03 ENCOUNTER — APPOINTMENT (OUTPATIENT)
Dept: ORTHOPEDIC SURGERY | Facility: CLINIC | Age: 75
End: 2025-06-03
Payer: MEDICARE

## 2025-06-03 DIAGNOSIS — Z96.651 PRESENCE OF RIGHT ARTIFICIAL KNEE JOINT: ICD-10-CM

## 2025-06-03 PROCEDURE — 99024 POSTOP FOLLOW-UP VISIT: CPT

## 2025-06-28 ENCOUNTER — NON-APPOINTMENT (OUTPATIENT)
Age: 75
End: 2025-06-28

## 2025-09-02 ENCOUNTER — APPOINTMENT (OUTPATIENT)
Dept: ORTHOPEDIC SURGERY | Facility: CLINIC | Age: 75
End: 2025-09-02
Payer: MEDICARE

## 2025-09-02 DIAGNOSIS — Z96.651 PRESENCE OF RIGHT ARTIFICIAL KNEE JOINT: ICD-10-CM

## 2025-09-02 PROCEDURE — 99213 OFFICE O/P EST LOW 20 MIN: CPT

## 2025-09-02 PROCEDURE — 73562 X-RAY EXAM OF KNEE 3: CPT | Mod: RT

## 2025-09-11 ENCOUNTER — NON-APPOINTMENT (OUTPATIENT)
Age: 75
End: 2025-09-11

## (undated) DEVICE — SAW BLADE BRASSELER LARGE BONE THK 75X25X0.89MM

## (undated) DEVICE — SUT VICRYL 1 36" CTX UNDYED

## (undated) DEVICE — GOWN IMPERV BREATHABLE XL

## (undated) DEVICE — PREP CHLORAPREP HI-LITE ORANGE 26ML

## (undated) DEVICE — MAKO DRAPE KIT

## (undated) DEVICE — GLV 8 PROTEXIS (WHITE)

## (undated) DEVICE — SUT STRATAFIX SPIRAL MONOCRYL PLUS 4-0 70CM PS-1 UNDYED

## (undated) DEVICE — DRSG STOCKINETTE CLOTH 6 X 72"

## (undated) DEVICE — GLV 8 PROTEXIS (BLUE)

## (undated) DEVICE — SUT PDO 2 1/2 CIRCLE 40MM NDL 45CM

## (undated) DEVICE — MAKO BLADE NARROW

## (undated) DEVICE — SUT VICRYL 2-0 36" CT-1 UNDYED

## (undated) DEVICE — ONETRAC LIGHTED RETRACTOR LXS CROWN TIP DISP

## (undated) DEVICE — NDL HYPO SAFE 20G X 1.5" (YELLOW)

## (undated) DEVICE — DRSG PICO NPWT 4X12"

## (undated) DEVICE — DRAPE 3/4 SHEET 52X76"

## (undated) DEVICE — SAW BLADE STRYKER SAGITTAL DUAL CUT 18MMX100MMX1.27MM

## (undated) DEVICE — HOOD FLYTE STRYKER HELMET SHIELD

## (undated) DEVICE — SOL IRR BAG NS 0.9% 3000ML

## (undated) DEVICE — SYR LUER LOK 20CC

## (undated) DEVICE — ELCTR STRYKER NEPTUNE SMOKE EVACUATION PENCIL (GREEN)

## (undated) DEVICE — SUT VLOC 90 4-0 18" P-12 UNDYED

## (undated) DEVICE — WARMING BLANKET UPPER ADULT

## (undated) DEVICE — PACK TOTAL KNEE

## (undated) DEVICE — SAW BLADE STRYKER WIDE MED 25MM X 73MM X 0.89MM

## (undated) DEVICE — DRAPE STERI-DRAPE INCISE 32X33"

## (undated) DEVICE — DRAPE IOBAN 33" X 23"

## (undated) DEVICE — DRAPE 1/2 SHEET 40X57"

## (undated) DEVICE — SUT STRATAFIX SPIRAL PDS PLUS 2-0 45CM CT-1

## (undated) DEVICE — HOOD T5 PEELAWAY

## (undated) DEVICE — MAKO VIZADISC KNEE TRACKING KIT

## (undated) DEVICE — ZIMMER PULSAVAC PLUS FAN KIT